# Patient Record
Sex: FEMALE | Race: WHITE | NOT HISPANIC OR LATINO | URBAN - METROPOLITAN AREA
[De-identification: names, ages, dates, MRNs, and addresses within clinical notes are randomized per-mention and may not be internally consistent; named-entity substitution may affect disease eponyms.]

---

## 2024-08-08 RX ORDER — LISINOPRIL 10 MG/1
10 TABLET ORAL
COMMUNITY
Start: 2023-08-08

## 2024-08-08 RX ORDER — ATORVASTATIN CALCIUM 10 MG/1
10 TABLET, FILM COATED ORAL
COMMUNITY
Start: 2023-12-06

## 2024-08-08 RX ORDER — ACETAMINOPHEN 500 MG
2000 TABLET ORAL DAILY
COMMUNITY

## 2024-08-08 RX ORDER — GLIMEPIRIDE 1 MG/1
1 TABLET ORAL
COMMUNITY
Start: 2024-05-21

## 2024-08-08 ASSESSMENT — DUKE ACTIVITY SCORE INDEX (DASI)
CAN YOU RUN A SHORT DISTANCE: YES
TOTAL_SCORE: 44.7
CAN YOU WALK INDOORS, SUCH AS AROUND YOUR HOUSE: YES
CAN YOU DO YARD WORK LIKE RAKING LEAVES, WEEDING OR PUSHING A MOWER: YES
CAN YOU DO MODERATE WORK AROUND THE HOUSE LIKE VACUUMING, SWEEPING FLOORS OR CARRYING GROCERIES: YES
CAN YOU WALK A BLOCK OR TWO ON LEVEL GROUND: YES
CAN YOU DO LIGHT WORK AROUND THE HOUSE LIKE DUSTING OR WASHING DISHES: YES
CAN YOU HAVE SEXUAL RELATIONS: YES
CAN YOU PARTICIPATE IN STRENOUS SPORTS LIKE SWIMMING, SINGLES TENNIS, FOOTBALL, BASKETBALL, OR SKIING: NO
CAN YOU DO HEAVY WORK AROUND THE HOUSE LIKE SCRUBBING FLOORS OR LIFTING AND MOVING HEAVY FURNITURE: YES
CAN YOU PARTICIPATE IN MODERATE RECREATIONAL ACTIVITIES LIKE GOLF, BOWLING, DANCING, DOUBLES TENNIS OR THROWING A BASEBALL OR FOOTBALL: NO
DASI METS SCORE: 8.2
CAN YOU CLIMB A FLIGHT OF STAIRS OR WALK UP A HILL: YES
CAN YOU TAKE CARE OF YOURSELF (EAT, DRESS, BATHE, OR USE TOILET): YES

## 2024-08-08 ASSESSMENT — ACTIVITIES OF DAILY LIVING (ADL): ADL_SCORE: 0

## 2024-08-08 ASSESSMENT — LIFESTYLE VARIABLES: SMOKING_STATUS: NONSMOKER

## 2024-08-09 ENCOUNTER — PRE-ADMISSION TESTING (OUTPATIENT)
Dept: PREADMISSION TESTING | Facility: HOSPITAL | Age: 69
End: 2024-08-09
Payer: COMMERCIAL

## 2024-08-09 ENCOUNTER — LAB (OUTPATIENT)
Dept: LAB | Facility: LAB | Age: 69
End: 2024-08-09
Payer: COMMERCIAL

## 2024-08-09 VITALS
OXYGEN SATURATION: 96 % | TEMPERATURE: 97 F | HEIGHT: 67 IN | RESPIRATION RATE: 16 BRPM | DIASTOLIC BLOOD PRESSURE: 76 MMHG | WEIGHT: 243.83 LBS | HEART RATE: 82 BPM | SYSTOLIC BLOOD PRESSURE: 132 MMHG | BODY MASS INDEX: 38.27 KG/M2

## 2024-08-09 DIAGNOSIS — M65.342 TRIGGER RING FINGER OF LEFT HAND: ICD-10-CM

## 2024-08-09 DIAGNOSIS — Z01.818 PREOP EXAMINATION: Primary | ICD-10-CM

## 2024-08-09 DIAGNOSIS — Z01.818 PREOP EXAMINATION: ICD-10-CM

## 2024-08-09 LAB
ANION GAP SERPL CALC-SCNC: 13 MMOL/L (ref 10–20)
BUN SERPL-MCNC: 22 MG/DL (ref 6–23)
CALCIUM SERPL-MCNC: 9.4 MG/DL (ref 8.6–10.3)
CHLORIDE SERPL-SCNC: 103 MMOL/L (ref 98–107)
CO2 SERPL-SCNC: 27 MMOL/L (ref 21–32)
CREAT SERPL-MCNC: 0.78 MG/DL (ref 0.5–1.05)
EGFRCR SERPLBLD CKD-EPI 2021: 83 ML/MIN/1.73M*2
EST. AVERAGE GLUCOSE BLD GHB EST-MCNC: 134 MG/DL
GLUCOSE SERPL-MCNC: 142 MG/DL (ref 74–99)
HBA1C MFR BLD: 6.3 %
POTASSIUM SERPL-SCNC: 4.5 MMOL/L (ref 3.5–5.3)
SODIUM SERPL-SCNC: 138 MMOL/L (ref 136–145)

## 2024-08-09 PROCEDURE — 99202 OFFICE O/P NEW SF 15 MIN: CPT

## 2024-08-09 PROCEDURE — 93005 ELECTROCARDIOGRAM TRACING: CPT

## 2024-08-09 PROCEDURE — 83036 HEMOGLOBIN GLYCOSYLATED A1C: CPT

## 2024-08-09 PROCEDURE — 93010 ELECTROCARDIOGRAM REPORT: CPT | Performed by: INTERNAL MEDICINE

## 2024-08-09 PROCEDURE — 80048 BASIC METABOLIC PNL TOTAL CA: CPT

## 2024-08-09 PROCEDURE — 36415 COLL VENOUS BLD VENIPUNCTURE: CPT

## 2024-08-09 ASSESSMENT — PAIN - FUNCTIONAL ASSESSMENT: PAIN_FUNCTIONAL_ASSESSMENT: 0-10

## 2024-08-09 NOTE — PREPROCEDURE INSTRUCTIONS
Medication List            Accurate as of August 9, 2024  8:40 AM. Always use your most recent med list.                atorvastatin 10 mg tablet  Commonly known as: Lipitor  Medication Adjustments for Surgery: Continue until night before surgery     cholecalciferol 50 mcg (2,000 unit) capsule  Commonly known as: Vitamin D-3  Medication Adjustments for Surgery: Stop 7 days before surgery     empagliflozin 25 mg  Commonly known as: Jardiance  Medication Adjustments for Surgery: Stop 3 days before surgery     glimepiride 1 mg tablet  Commonly known as: Amaryl  Medication Adjustments for Surgery: Stop 1 day before surgery     lisinopril 10 mg tablet  Medication Adjustments for Surgery: Other (Comment)  Notes to patient: HOLD any evening dose the night before the day of surgery  HOLD the day of surgery                                  PRE-OPERATIVE INSTRUCTIONS    You will receive notification one business day prior to your procedure to confirm your arrival time. It is important that you answer your phone and/or check your messages during this time. If you do not hear from the surgery center by 5 pm. the day before your procedure, please call [phone].     Please enter the building through the Outpatient entrance and take the elevator off the lobby to the 2nd floor then check in at the Outpatient Surgery desk on the 2nd floor.    INSTRUCTIONS:  Talk to your surgeon for instructions if you should stop your aspirin, blood thinner, or diabetes medicines.  DO NOT take any multivitamins or over the counter supplements for 7-10 days before surgery.  If not being admitted, you must have an adult immediately available to drive you home after surgery. We also highly recommend you have someone stay with you for the entire day and night of your surgery.  For children having surgery, a parent or legal guardian must accompany them to the surgery center. If this is not possible, please call [phone] to make additional  arrangements.  For adults who are unable to consent or make medical decisions for themselves, a legal guardian or Power of  must accompany them to the surgery center. If this is not possible, please call [phone] to make additional arrangements.  Wear comfortable, loose fitting clothing.  All jewelry and piercings must be removed. If you are unable to remove an item or have a dermal piercing, please be sure to tell the nurse when you arrive for surgery.  Nail polish and make-up must be removed.  Avoid smoking or consuming alcohol for 24 hours before surgery.  To help prevent infection, please take a shower/bath and wash your hair the night before and/or morning of surgery (or follow other specific bathing instructions provided).    Preoperative Fasting Guidelines    Why must I stop eating and drinking near surgery time?  With sedation, food or liquid in your stomach can enter your lungs causing serious complications  Increases nausea and vomiting    When do I need to stop eating and drinking before my surgery?  Do not eat any solid food after midnight the night before your surgery/procedure unless otherwise instructed by your surgeon.   You may have up to 13.5 ounces of clear liquid until TWO hours before your instructed arrival time to the hospital.  This includes water, black tea/coffee, (no milk or cream) apple juice, and electrolyte drinks (Gatorade).   You may chew gum until TWO hours before your surgery/procedure      If applicable, notify your surgeons office immediately of any new skin changes that occur to the surgical limb.      If you have any questions or concerns, please call Pre-Admission Testing at [phone].       Home Preoperative Antibacterial Shower with Chlorhexidine gluconate (CHG)     What is a home preoperative antibacterial shower?  This shower is a way of cleaning the skin with a germ killing solution before surgery. The solution contains chlorhexidine gluconate, commonly  known as CHG. CHG is a skin cleanser with germ killing ability. Let your doctor know if you are allergic to chlorhexidine.    Why do I need to take a preoperative antibacterial shower?  Skin is not sterile. It is best to try to make your skin as free of germs as possible before surgery. Proper cleansing with a germ killing soap before surgery can lower the number of germs on your skin. This helps to reduce the risk of infection at the surgical site. Following the instructions listed below will help you prepare your skin for surgery.    How do I use the solution?  Begin using your CHG soap the night before and again the morning of your procedure.   Do not shave the day before or day of surgery.  Remove all jewelry until after surgery. Take off rings and take out all body-piercing jewelry.  Wash your face and hair with normal soap and shampoo before you use the CHG soap.  Apply the CHG solution to a clean wet washcloth. Move away from the water to avoid premature rinsing of the CHG soap as you are applying. Firmly lather your entire body from the neck down. Do not use CHG on your face, eyes, ears, or genitals.   Pay special attention to the area where your incisions will be located.  Do not scrub your skin too hard.  It is important to allow the CHG soap to sit on your skin for 3-5 minutes.  Rinse the solution off your body completely. Do not wash with your normal soap after the CHG soap solution.  Pat yourself dry with a clean, soft towel.  Do not apply powders, lotions or deodorants as these might block how the CHG soap works.   Dress in clean clothing.  Be sure to sleep with clean, freshly laundered sheets.  Be aware that CHG can cause stains on fabric. Rinse your washcloth and other linens that have contact with CHG completely. Use only non-chlorine detergents to launder the items used.

## 2024-08-09 NOTE — H&P (VIEW-ONLY)
"CPM/PAT Evaluation       Name: Yudi Salas (Yudi Salas)  /Age: 1955/68 y.o.     In-Person       Chief Complaint: Left ring trigger finger     HPI  Pleasant 67 y/o female presents with left ring trigger finger. She has had roughly 3 months of pain and \"locking\" of her finger. Denies other associated symptoms. Denies recent fever/illness/chills.     Past Medical History:   Diagnosis Date    Acquired lymphedema     Arthritis     DVT, lower extremity, distal, acute, right (Multi)     Hyperlipidemia     Hypertension     Liver fibrosis     Macular degeneration, age related, exudative (Multi)     Osteoarthritis     Osteopenia     Type 2 diabetes mellitus (Multi)        Past Surgical History:   Procedure Laterality Date    APPENDECTOMY       SECTION, LOW TRANSVERSE      CHOLECYSTECTOMY      COLONOSCOPY      DILATION AND CURETTAGE OF UTERUS      FOOT SURGERY      KNEE ARTHROPLASTY      KNEE SURGERY      LIVER BIOPSY N/A     OTHER SURGICAL HISTORY      Low back surgery       Patient  has no history on file for sexual activity.    Family History   Problem Relation Name Age of Onset    Colon cancer Mother      Cervical cancer Mother      Colon cancer Father      Heart attack Father      Hypertension Brother         No Known Allergies    Prior to Admission medications    Medication Sig Start Date End Date Taking? Authorizing Provider   atorvastatin (Lipitor) 10 mg tablet Take 1 tablet (10 mg) by mouth once daily. 23  Yes Historical Provider, MD   empagliflozin (Jardiance) 25 mg Take 1 tablet (25 mg) by mouth once daily. 24  Yes Historical Provider, MD   glimepiride (Amaryl) 1 mg tablet Take 1 tablet (1 mg) by mouth 2 times daily (morning and late afternoon). 24  Yes Historical Provider, MD   lisinopril 10 mg tablet Take 1 tablet (10 mg) by mouth once daily. 23  Yes Historical Provider, MD   cholecalciferol (Vitamin D-3) 50 mcg (2,000 unit) capsule Take 1 capsule (50 mcg) by mouth early in " the morning..    Historical Provider, MD        Constitutional: Negative for fever, chills, or sweats   ENMT: Negative for nasal discharge, congestion, ear pain, mouth pain, throat pain. Positive for glasses.   Respiratory: Negative for cough, wheezing, shortness of breath   Cardiac: Negative for chest pain, dyspnea on exertion, palpitations   Gastrointestinal: Negative for nausea, vomiting, diarrhea, constipation, abdominal pain  Genitourinary: Negative for dysuria, flank pain, frequency, hematuria   Musculoskeletal: Negative for decreased ROM, pain, swelling, weakness. See HPI.  Neurological: Negative for dizziness, confusion, headache  Psychiatric: Negative for mood changes   Skin: Negative for itching, rash, ulcer    Hematologic/Lymph: Negative for bruising, easy bleeding  Allergic/Immunologic: Negative itching, sneezing, swelling      Physical Exam  Vitals reviewed.   Constitutional:       Appearance: Normal appearance. She is obese.   HENT:      Head: Normocephalic.      Mouth/Throat:      Mouth: Mucous membranes are moist.      Pharynx: Oropharynx is clear.   Eyes:      Pupils: Pupils are equal, round, and reactive to light.   Cardiovascular:      Rate and Rhythm: Normal rate and regular rhythm.      Heart sounds: Normal heart sounds.   Pulmonary:      Effort: Pulmonary effort is normal.      Breath sounds: Normal breath sounds.   Abdominal:      General: Bowel sounds are normal.      Palpations: Abdomen is soft.   Musculoskeletal:         General: Normal range of motion.      Cervical back: Normal range of motion.   Skin:     General: Skin is warm and dry.   Neurological:      General: No focal deficit present.      Mental Status: She is alert and oriented to person, place, and time.   Psychiatric:         Mood and Affect: Mood normal.         Behavior: Behavior normal.          PAT AIRWAY:   Airway:     Mallampati::  II    Neck ROM::  Full  normal        Visit Vitals  /76   Pulse 82   Temp 36.1 °C (97  °F) (Temporal)   Resp 16       DASI Risk Score      Flowsheet Row Most Recent Value   DASI SCORE 44.7   METS Score (Will be calculated only when all the questions are answered) 8.2          Caprini DVT Assessment      Flowsheet Row Most Recent Value   DVT Score 10   Current Status Minor surgery planned   History SVT, DVT/PE, Prior major surgery  [Small DVT Right lower leg while flying]   Age 60-75 years   BMI 31-40 (Obesity)          Modified Frailty Index      Flowsheet Row Most Recent Value   Modified Frailty Index Calculator .1818          CHADS2 Stroke Risk  Current as of 9 minutes ago        N/A 3 to 100%: High Risk   2 to < 3%: Medium Risk   0 to < 2%: Low Risk     Last Change: N/A          This score determines the patient's risk of having a stroke if the patient has atrial fibrillation.        This score is not applicable to this patient. Components are not calculated.          Revised Cardiac Risk Index    No data to display       Apfel Simplified Score    No data to display       Risk Analysis Index Results This Encounter         8/8/2024  1005             TRUJILLO Cancer History: Patient does not indicate history of cancer    Total Risk Analysis Index Score Without Cancer: 20    Total Risk Analysis Index Score: 20          Stop Bang Score      Flowsheet Row Most Recent Value   Do you snore loudly? 0   Do you often feel tired or fatigued after your sleep? 0   Has anyone ever observed you stop breathing in your sleep? 0   Do you have or are you being treated for high blood pressure? 1   Recent BMI (Calculated) 40.2   Is BMI greater than 35 kg/m2? 1=Yes   Age older than 50 years old? 1=Yes   Is your neck circumference greater than 17 inches (Male) or 16 inches (Female)? 0   Gender - Male 0=No   STOP-BANG Total Score 3            Assessment and Plan:     Assessment and Plan:     Preop:   OR with Dr. Bridges on 8/14  Labs ordered per anesthesia guidelines   EKG obtained and enclosed. Abnormal EKG. Comparable EKG on  "file under media.     Cardiac:  Hypertension: Controlled with medical management   Hyperlipidemia: On statin   Lymphedema: Stable per patient   H/O DVT: Roughly 20 years ago   RCI 0, 3.9% risk for postoperative MACE  Cavazos Activity Status Index (DASI)  DASI Score: 44.7   MET Score: 8.2    GI:  GERD: Stable     Endocrine:  DM II: Oral medications. Ha1c ordered today   Hemoglobin A1C   Date Value Ref Range Status   02/03/2024 6.7 (H) 4.3 - 5.6 % Final     Comment:     American Diabetes Association guidelines indicate that patients with HgbA1c in the range 5.7-6.4% are at increased risk for development of diabetes, and intervention by lifestyle modification may be beneficial. HgbA1c greater or equal to 6.5% is considered diagnostic of diabetes.      Neuro-muscular:   Osteopenia: On vitamin supplements   Osteoarthritis: Mainly of the left knee     General:   Liver fibrosis: Monitored by PCP     Anesthesia: Patient has no history of anesthesia complications. She does feel she \"needs more anesthesia than normal people\".     *See risk scores as previously documented   "

## 2024-08-09 NOTE — PREPROCEDURE INSTRUCTIONS
Patient and Family Education             Ways You Can Help Prevent Blood Clots             This handout explains some simple things you can do to help prevent blood clots.      Blood clots are blockages that can form in the body's veins. When a blood clot forms in your deep veins, it may be called a deep vein thrombosis, or DVT for short. Blood clots can happen in any part of the body where blood flows, but they are most common in the arms and legs. If a piece of a blood clot breaks free and travels to the lungs, it is called a pulmonary embolus (PE). A PE can be a very serious problem.         Being in the hospital or having surgery can raise your chances of getting a blood clot because you may not be well enough to move around as much as you normally do.         Ways you can help prevent blood clots in the hospital         Wearing SCDs. SCDs stands for Sequential Compression Devices.   SCDs are special sleeves that wrap around your legs  They attach to a pump that fills them with air to gently squeeze your legs every few minutes.   This helps return the blood in your legs to your heart.   SCDs should only be taken off when walking or bathing.   SCDs may not be comfortable, but they can help save your life.               Wearing compression stockings - if your doctor orders them. These special snug fitting stockings gently squeeze your legs to help blood flow.       Walking. Walking helps move the blood in your legs.   If your doctor says it is ok, try walking the halls at least   5 times a day. Ask us to help you get up, so you don't fall.      Taking any blood thinning medicines your doctor orders.        Page 1 of 2     [facility]; 3/23   Ways you can help prevent blood clots at home       Wearing compression stockings - if your doctor orders them. ? Walking - to help move the blood in your legs.       Taking any blood thinning medicines your doctor orders.      Signs of a blood clot or PE       Tell your doctor or nurse know right away if you have of the problems listed below.    If you are at home, seek medical care right away. Call 911 for chest pain or problems breathing.               Signs of a blood clot (DVT) - such as pain,  swelling, redness or warmth in your arm or leg      Signs of a pulmonary embolism (PE) - such as chest     pain or feeling short of breath

## 2024-08-10 LAB
ATRIAL RATE: 79 BPM
P AXIS: 50 DEGREES
P OFFSET: 183 MS
P ONSET: 128 MS
PR INTERVAL: 182 MS
Q ONSET: 219 MS
QRS COUNT: 13 BEATS
QRS DURATION: 84 MS
QT INTERVAL: 390 MS
QTC CALCULATION(BAZETT): 447 MS
QTC FREDERICIA: 427 MS
R AXIS: -12 DEGREES
T AXIS: 23 DEGREES
T OFFSET: 414 MS
VENTRICULAR RATE: 79 BPM

## 2024-08-14 ENCOUNTER — ANESTHESIA EVENT (OUTPATIENT)
Dept: OPERATING ROOM | Facility: HOSPITAL | Age: 69
End: 2024-08-14
Payer: COMMERCIAL

## 2024-08-14 ENCOUNTER — ANESTHESIA (OUTPATIENT)
Dept: OPERATING ROOM | Facility: HOSPITAL | Age: 69
End: 2024-08-14
Payer: COMMERCIAL

## 2024-08-14 ENCOUNTER — HOSPITAL ENCOUNTER (OUTPATIENT)
Facility: HOSPITAL | Age: 69
Setting detail: OUTPATIENT SURGERY
Discharge: HOME | End: 2024-08-14
Attending: ORTHOPAEDIC SURGERY | Admitting: ORTHOPAEDIC SURGERY
Payer: COMMERCIAL

## 2024-08-14 VITALS
BODY MASS INDEX: 37.67 KG/M2 | HEIGHT: 67 IN | WEIGHT: 240 LBS | SYSTOLIC BLOOD PRESSURE: 132 MMHG | HEART RATE: 82 BPM | RESPIRATION RATE: 17 BRPM | DIASTOLIC BLOOD PRESSURE: 74 MMHG | OXYGEN SATURATION: 97 % | TEMPERATURE: 97.2 F

## 2024-08-14 DIAGNOSIS — M65.342 TRIGGER FINGER, LEFT RING FINGER: Primary | ICD-10-CM

## 2024-08-14 LAB — GLUCOSE BLD MANUAL STRIP-MCNC: 153 MG/DL (ref 74–99)

## 2024-08-14 PROCEDURE — 7100000010 HC PHASE TWO TIME - EACH INCREMENTAL 1 MINUTE: Performed by: ORTHOPAEDIC SURGERY

## 2024-08-14 PROCEDURE — 3600000003 HC OR TIME - INITIAL BASE CHARGE - PROCEDURE LEVEL THREE: Performed by: ORTHOPAEDIC SURGERY

## 2024-08-14 PROCEDURE — A26055 PR INCISE FINGER TENDON SHEATH: Performed by: ANESTHESIOLOGY

## 2024-08-14 PROCEDURE — 2500000004 HC RX 250 GENERAL PHARMACY W/ HCPCS (ALT 636 FOR OP/ED): Mod: JZ | Performed by: ORTHOPAEDIC SURGERY

## 2024-08-14 PROCEDURE — 2720000007 HC OR 272 NO HCPCS: Performed by: ORTHOPAEDIC SURGERY

## 2024-08-14 PROCEDURE — 2500000004 HC RX 250 GENERAL PHARMACY W/ HCPCS (ALT 636 FOR OP/ED): Performed by: ANESTHESIOLOGIST ASSISTANT

## 2024-08-14 PROCEDURE — 3600000008 HC OR TIME - EACH INCREMENTAL 1 MINUTE - PROCEDURE LEVEL THREE: Performed by: ORTHOPAEDIC SURGERY

## 2024-08-14 PROCEDURE — A26055 PR INCISE FINGER TENDON SHEATH: Performed by: ANESTHESIOLOGIST ASSISTANT

## 2024-08-14 PROCEDURE — 3700000002 HC GENERAL ANESTHESIA TIME - EACH INCREMENTAL 1 MINUTE: Performed by: ORTHOPAEDIC SURGERY

## 2024-08-14 PROCEDURE — 2500000005 HC RX 250 GENERAL PHARMACY W/O HCPCS: Performed by: ORTHOPAEDIC SURGERY

## 2024-08-14 PROCEDURE — 3700000001 HC GENERAL ANESTHESIA TIME - INITIAL BASE CHARGE: Performed by: ORTHOPAEDIC SURGERY

## 2024-08-14 PROCEDURE — 82947 ASSAY GLUCOSE BLOOD QUANT: CPT

## 2024-08-14 PROCEDURE — 7100000009 HC PHASE TWO TIME - INITIAL BASE CHARGE: Performed by: ORTHOPAEDIC SURGERY

## 2024-08-14 RX ORDER — FENTANYL CITRATE 50 UG/ML
INJECTION, SOLUTION INTRAMUSCULAR; INTRAVENOUS AS NEEDED
Status: DISCONTINUED | OUTPATIENT
Start: 2024-08-14 | End: 2024-08-14

## 2024-08-14 RX ORDER — MIDAZOLAM HYDROCHLORIDE 1 MG/ML
INJECTION, SOLUTION INTRAMUSCULAR; INTRAVENOUS AS NEEDED
Status: DISCONTINUED | OUTPATIENT
Start: 2024-08-14 | End: 2024-08-14

## 2024-08-14 RX ORDER — HYDROMORPHONE HYDROCHLORIDE 1 MG/ML
1 INJECTION, SOLUTION INTRAMUSCULAR; INTRAVENOUS; SUBCUTANEOUS EVERY 5 MIN PRN
Status: DISCONTINUED | OUTPATIENT
Start: 2024-08-14 | End: 2024-08-14 | Stop reason: HOSPADM

## 2024-08-14 RX ORDER — MIDAZOLAM HYDROCHLORIDE 1 MG/ML
1 INJECTION, SOLUTION INTRAMUSCULAR; INTRAVENOUS ONCE AS NEEDED
Status: CANCELLED | OUTPATIENT
Start: 2024-08-14

## 2024-08-14 RX ORDER — HYDRALAZINE HYDROCHLORIDE 20 MG/ML
5 INJECTION INTRAMUSCULAR; INTRAVENOUS EVERY 30 MIN PRN
Status: DISCONTINUED | OUTPATIENT
Start: 2024-08-14 | End: 2024-08-14 | Stop reason: HOSPADM

## 2024-08-14 RX ORDER — METOCLOPRAMIDE HYDROCHLORIDE 5 MG/ML
10 INJECTION INTRAMUSCULAR; INTRAVENOUS ONCE AS NEEDED
Status: DISCONTINUED | OUTPATIENT
Start: 2024-08-14 | End: 2024-08-14 | Stop reason: HOSPADM

## 2024-08-14 RX ORDER — SODIUM CHLORIDE, SODIUM LACTATE, POTASSIUM CHLORIDE, CALCIUM CHLORIDE 600; 310; 30; 20 MG/100ML; MG/100ML; MG/100ML; MG/100ML
INJECTION, SOLUTION INTRAVENOUS CONTINUOUS PRN
Status: DISCONTINUED | OUTPATIENT
Start: 2024-08-14 | End: 2024-08-14

## 2024-08-14 RX ORDER — DIPHENHYDRAMINE HYDROCHLORIDE 50 MG/ML
12.5 INJECTION INTRAMUSCULAR; INTRAVENOUS ONCE AS NEEDED
Status: DISCONTINUED | OUTPATIENT
Start: 2024-08-14 | End: 2024-08-14 | Stop reason: HOSPADM

## 2024-08-14 RX ORDER — HYDROCODONE BITARTRATE AND ACETAMINOPHEN 5; 325 MG/1; MG/1
1 TABLET ORAL EVERY 4 HOURS PRN
Status: DISCONTINUED | OUTPATIENT
Start: 2024-08-14 | End: 2024-08-14 | Stop reason: HOSPADM

## 2024-08-14 RX ORDER — CEFAZOLIN SODIUM 2 G/100ML
2 INJECTION, SOLUTION INTRAVENOUS ONCE
Status: COMPLETED | OUTPATIENT
Start: 2024-08-14 | End: 2024-08-14

## 2024-08-14 RX ORDER — ALBUTEROL SULFATE 0.83 MG/ML
2.5 SOLUTION RESPIRATORY (INHALATION)
Status: DISCONTINUED | OUTPATIENT
Start: 2024-08-14 | End: 2024-08-14 | Stop reason: HOSPADM

## 2024-08-14 RX ORDER — LABETALOL HYDROCHLORIDE 5 MG/ML
5 INJECTION, SOLUTION INTRAVENOUS
Status: DISCONTINUED | OUTPATIENT
Start: 2024-08-14 | End: 2024-08-14 | Stop reason: HOSPADM

## 2024-08-14 RX ORDER — LIDOCAINE HYDROCHLORIDE AND EPINEPHRINE 10; 10 MG/ML; UG/ML
INJECTION, SOLUTION INFILTRATION; PERINEURAL AS NEEDED
Status: DISCONTINUED | OUTPATIENT
Start: 2024-08-14 | End: 2024-08-14 | Stop reason: HOSPADM

## 2024-08-14 RX ORDER — HYDROCODONE BITARTRATE AND ACETAMINOPHEN 5; 325 MG/1; MG/1
1 TABLET ORAL EVERY 4 HOURS PRN
Qty: 10 TABLET | Refills: 0 | Status: SHIPPED | OUTPATIENT
Start: 2024-08-14

## 2024-08-14 RX ORDER — SODIUM CHLORIDE, SODIUM LACTATE, POTASSIUM CHLORIDE, CALCIUM CHLORIDE 600; 310; 30; 20 MG/100ML; MG/100ML; MG/100ML; MG/100ML
100 INJECTION, SOLUTION INTRAVENOUS CONTINUOUS
Status: DISCONTINUED | OUTPATIENT
Start: 2024-08-14 | End: 2024-08-14 | Stop reason: HOSPADM

## 2024-08-14 RX ORDER — PROPOFOL 10 MG/ML
INJECTION, EMULSION INTRAVENOUS AS NEEDED
Status: DISCONTINUED | OUTPATIENT
Start: 2024-08-14 | End: 2024-08-14

## 2024-08-14 RX ORDER — ONDANSETRON HYDROCHLORIDE 2 MG/ML
INJECTION, SOLUTION INTRAVENOUS AS NEEDED
Status: DISCONTINUED | OUTPATIENT
Start: 2024-08-14 | End: 2024-08-14

## 2024-08-14 SDOH — HEALTH STABILITY: MENTAL HEALTH: CURRENT SMOKER: 0

## 2024-08-14 ASSESSMENT — PAIN - FUNCTIONAL ASSESSMENT
PAIN_FUNCTIONAL_ASSESSMENT: 0-10

## 2024-08-14 ASSESSMENT — PAIN SCALES - GENERAL
PAINLEVEL_OUTOF10: 0 - NO PAIN
PAIN_LEVEL: 0
PAINLEVEL_OUTOF10: 0 - NO PAIN

## 2024-08-14 ASSESSMENT — COLUMBIA-SUICIDE SEVERITY RATING SCALE - C-SSRS
2. HAVE YOU ACTUALLY HAD ANY THOUGHTS OF KILLING YOURSELF?: NO
1. IN THE PAST MONTH, HAVE YOU WISHED YOU WERE DEAD OR WISHED YOU COULD GO TO SLEEP AND NOT WAKE UP?: NO
6. HAVE YOU EVER DONE ANYTHING, STARTED TO DO ANYTHING, OR PREPARED TO DO ANYTHING TO END YOUR LIFE?: NO

## 2024-08-14 NOTE — ANESTHESIA PREPROCEDURE EVALUATION
Patient: Yudi Salas    Procedure Information       Date/Time: 24 1000    Procedure: Left ring finger trigger finger release (Left: Fingers)    Location: [facility] 06 / [facility]    Surgeons: Daniel Bridges MD            Relevant Problems   No relevant active problems       Clinical information reviewed:   Tobacco  Allergies  Meds   Med Hx  Surg Hx   Fam Hx  Soc Hx        NPO Detail:  NPO/Void Status  Date of Last Liquid: 24  Time of Last Liquid: 06  Date of Last Solid: 24  Time of Last Solid:   Last Intake Type: Clear fluids  Time of Last Void: 08         Physical Exam    Airway  Mallampati: III  TM distance: >3 FB  Neck ROM: full     Cardiovascular - normal exam     Dental - normal exam     Pulmonary - normal exam     Abdominal - normal exam           Vitals:    24 0852   BP: 142/85   Pulse: 81   Resp: 17   Temp: 37 °C (98.6 °F)   SpO2: 96%       Past Surgical History:   Procedure Laterality Date    APPENDECTOMY       SECTION, LOW TRANSVERSE      CHOLECYSTECTOMY      COLONOSCOPY      DILATION AND CURETTAGE OF UTERUS      FOOT SURGERY      KNEE ARTHROPLASTY      KNEE SURGERY      LIVER BIOPSY N/A     OTHER SURGICAL HISTORY      Low back surgery     Past Medical History:   Diagnosis Date    Acquired lymphedema     Arthritis     DVT, lower extremity, distal, acute, right (Multi)     Hyperlipidemia     Hypertension     Liver fibrosis     Macular degeneration, age related, exudative (Multi)     Osteoarthritis     Osteopenia     Type 2 diabetes mellitus (Multi)        Current Facility-Administered Medications:     ceFAZolin (Ancef) 2 g in dextrose (iso)  mL, 2 g, intravenous, Once, Daniel Bridges MD  Prior to Admission medications    Medication Sig Start Date End Date Taking? Authorizing Provider   atorvastatin (Lipitor) 10 mg tablet Take 1 tablet (10 mg) by mouth once daily. 23  Yes Historical Provider, MD   cholecalciferol (Vitamin D-3) 50 mcg (2,000  "unit) capsule Take 1 capsule (50 mcg) by mouth early in the morning..   Yes Historical Provider, MD   empagliflozin (Jardiance) 25 mg Take 1 tablet (25 mg) by mouth once daily. 5/21/24  Yes Historical Provider, MD   glimepiride (Amaryl) 1 mg tablet Take 1 tablet (1 mg) by mouth 2 times daily (morning and late afternoon). 5/21/24  Yes Historical Provider, MD   lisinopril 10 mg tablet Take 1 tablet (10 mg) by mouth once daily. 8/8/23   Historical Provider, MD     No Known Allergies  Social History     Tobacco Use    Smoking status: Never    Smokeless tobacco: Never   Substance Use Topics    Alcohol use: Not on file     Comment: rarely         Chemistry    Lab Results   Component Value Date/Time     08/09/2024 0846    K 4.5 08/09/2024 0846     08/09/2024 0846    CO2 27 08/09/2024 0846    BUN 22 08/09/2024 0846    CREATININE 0.78 08/09/2024 0846    Lab Results   Component Value Date/Time    CALCIUM 9.4 08/09/2024 0846    ALKPHOS 63 07/12/2023 2258    AST 20 07/12/2023 2258    ALT 19 07/12/2023 2258    BILITOT 0.4 07/12/2023 2258          Lab Results   Component Value Date/Time    WBC 13.8 (H) 07/12/2023 2258    HGB 16.8 (H) 07/12/2023 2258    HCT 48.7 (H) 07/12/2023 2258     07/12/2023 2258     No results found for: \"PROTIME\", \"PTT\", \"INR\"  Encounter Date: 08/09/24   ECG 12 lead (Clinic Performed)   Result Value    Ventricular Rate 79    Atrial Rate 79    KY Interval 182    QRS Duration 84    QT Interval 390    QTC Calculation(Bazett) 447    P Axis 50    R Axis -12    T Axis 23    QRS Count 13    Q Onset 219    P Onset 128    P Offset 183    T Offset 414    QTC Fredericia 427    Narrative    Normal sinus rhythm  Possible Left atrial enlargement  Cannot rule out Inferior infarct , age undetermined  Poor R-wave progression  Abnormal ECG  When compared with ECG of 02-DEC-2020 09:06,  Possible Inferior infarct is now Present  Confirmed by Osbaldo Dotson ([id number]) on 8/10/2024 2:49:54 PM        Anesthesia " Plan    History of general anesthesia?: yes  History of complications of general anesthesia?: no    ASA 2     MAC     The patient is not a current smoker.    intravenous induction   Anesthetic plan and risks discussed with patient.    Plan discussed with [facility].

## 2024-08-14 NOTE — DISCHARGE INSTRUCTIONS
Patient underwent a left ring finger trigger finger release.    Dressing to remain on until 8/16/2024.    Follow-up after dressing removal May cover with Band-Aid.    Should keep covered and from getting wet until seen in office.  You may shower however keep surgical site clean and dry and covered with either plastic bag or cast protector.    If you notice any, redness, drainage or your incision comes apart please call the office [phone].    No lifting, pulling, or pushing with the left upper extremity greater than 5 pounds.    Patient may take ibuprofen 400 mg every 6 hours to help with pain relief.    You may resume your prehospital diet.    You are also given a prescription for narcotic medication.  This should be used intermittently and only on an as-needed basis.    Follow-up in office at previously scheduled appointment.    Daniel Bridges MD  [facility]  [phone]

## 2024-08-14 NOTE — OP NOTE
Left ring finger trigger finger release (L) Operative Note     Date: 2024  OR Location: [facility]    Name: Yudi Salas, : 1955, Age: 68 y.o., MRN: [id number], Sex: female    Diagnosis  Pre-op Diagnosis      * Trigger finger, left ring finger [M65.342] Post-op Diagnosis     * Trigger finger, left ring finger [M65.342]     Procedures  Left ring finger trigger finger release  90681 - IL TENDON SHEATH INCISION      Surgeons      * Daniel Bridges - Primary    Resident/Fellow/Other Assistant:  Surgeons and Role:     * Carlos Nix PA-C - Assisting    Procedure Summary  Anesthesia: Monitor Anesthesia Care  ASA: II  Anesthesia Staff: Anesthesiologist: Anne Raymond MD  C-AA: YADIRA Oro  Estimated Blood Loss: 0 mL  Intra-op Medications:   Administrations occurring from 1000 to 1055 on 24:   Medication Name Total Dose   ceFAZolin (Ancef) 2 g in dextrose (iso)  mL 2 g              Anesthesia Record               Intraprocedure I/O Totals       None           Specimen: No specimens collected     Staff:   Circulator: Vincent Pemberton Person: Ellie         Drains and/or Catheters: * None in log *    Tourniquet Times:         Implants:     Findings: Trigger finger left ring finger    Indications: Yudi Salas is an 68 y.o. female who is having surgery for left ring trigger finger.  Patient has had ongoing catching and locking appreciated a left ring finger which is caused pain and difficulty using it.  We discussed multiple measures to address this and ultimately recommendation was made to surgically proceed with A1 pulley release of the left ring finger.  Wrist benefits and alternatives were discussed extents with the patient she was in agreement to proceed.    The patient was seen in the preoperative area. The risks, benefits, complications, treatment options, non-operative alternatives, expected recovery and outcomes were discussed with the patient. The possibilities of reaction to  medication, pulmonary aspiration, injury to surrounding structures, bleeding, recurrent infection, the need for additional procedures, failure to diagnose a condition, and creating a complication requiring transfusion or operation were discussed with the patient. The patient concurred with the proposed plan, giving informed consent.  The site of surgery was properly noted/marked if necessary per policy. The patient has been actively warmed in preoperative area. Preoperative antibiotics have been ordered and given within 1 hours of incision. Venous thrombosis prophylaxis are not indicated.    Procedure Details: Patient was seen in the preoperative holding area and confirmed appropriate location of the triggering digit which was the left ring finger.  Patient was taken the op room and placed supine the operative table.  The left upper extremity was prepped and draped as is typical for extremity procedure with an arm tourniquet applied.  A timeout was performed, all parties identified, and the correct surgical site was noted.  Volar aspect about the proximal flexion crease along the left ring finger.  Injection of 7 cc of 1% lidocaine with epinephrine was utilized to provide regional analgesia.  The hand and forearm were exsanguinated and tourniquet was inflated to 250 mmHg.  Proximal flexion crease which was centered over the A1 pulley of the left ring finger was incised in a mildly oblique manner in line with the flexion crease and after exposure of subcutaneous tissue blunt dissection was carried down to the level of the flexor tendon sheath.  Neurovascular bundles were visualized and protected both radially and ulnarly.  A1 pulley sheath demonstrated extensive scarring and fibrosis.  And obvious locking and catching appreciated on gentle flexion and extension of the digit.  The A1 pulley was released longitudinally along the flexor tendon sheath.  This was carried partially into the A2 portion distally to allow for  appropriate gliding and the A0 pulley was released proximally.  Both tendons were elevated out of the surgical wound with no catching or locking appreciated.  Patient has extensive arthritic change to the DIP joint and as such this did not demonstrate terminal extent of flexion however tendons overall appeared healthy.  Wound was irrigated with normal saline.  Skin was closed with Prolene suture.  A soft compressive dressing was applied and tourniquet was subsequently deflated with excellent reperfusion of the hand.    An assistant was used during this case.  Assistant Carlos Nix PA-C helped in the positioning, prepping, draping, retracting and aiding in my ability to perform critical portions of this case and performed the majority of the closure and dressing application.  Help from the assistant was critical in performing these functions.    Complications:  None; patient tolerated the procedure well.    Disposition: PACU - hemodynamically stable.  Condition: stable       Daniel J Yuliana  Phone Number: [phone]

## 2025-07-03 ENCOUNTER — APPOINTMENT (OUTPATIENT)
Dept: RADIOLOGY | Facility: HOSPITAL | Age: 70
End: 2025-07-03
Payer: MEDICARE

## 2025-07-03 ENCOUNTER — HOSPITAL ENCOUNTER (EMERGENCY)
Facility: HOSPITAL | Age: 70
Discharge: HOME | End: 2025-07-03
Payer: MEDICARE

## 2025-07-03 VITALS
BODY MASS INDEX: 37.67 KG/M2 | RESPIRATION RATE: 16 BRPM | HEART RATE: 100 BPM | OXYGEN SATURATION: 96 % | HEIGHT: 67 IN | WEIGHT: 240 LBS | TEMPERATURE: 98 F | SYSTOLIC BLOOD PRESSURE: 148 MMHG | DIASTOLIC BLOOD PRESSURE: 87 MMHG
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
## 2025-07-03 DIAGNOSIS — S90.01XA CONTUSION OF RIGHT ANKLE, INITIAL ENCOUNTER: ICD-10-CM

## 2025-07-03 PROCEDURE — 2500000001 HC RX 250 WO HCPCS SELF ADMINISTERED DRUGS (ALT 637 FOR MEDICARE OP)

## 2025-07-03 PROCEDURE — 73630 X-RAY EXAM OF FOOT: CPT | Mod: RT

## 2025-07-03 PROCEDURE — 73630 X-RAY EXAM OF FOOT: CPT | Mod: RIGHT SIDE | Performed by: RADIOLOGY

## 2025-07-03 PROCEDURE — 99284 EMERGENCY DEPT VISIT MOD MDM: CPT

## 2025-07-03 PROCEDURE — 73610 X-RAY EXAM OF ANKLE: CPT | Mod: RT

## 2025-07-03 PROCEDURE — 73610 X-RAY EXAM OF ANKLE: CPT | Mod: RIGHT SIDE | Performed by: RADIOLOGY

## 2025-07-03 PROCEDURE — 2500000004 HC RX 250 GENERAL PHARMACY W/ HCPCS (ALT 636 FOR OP/ED)

## 2025-07-03 RX ORDER — MELOXICAM 15 MG/1
15 TABLET ORAL AS NEEDED
COMMUNITY

## 2025-07-03 RX ORDER — OXYCODONE AND ACETAMINOPHEN 5; 325 MG/1; MG/1
1 TABLET ORAL ONCE
Refills: 0 | Status: COMPLETED | OUTPATIENT
Start: 2025-07-03 | End: 2025-07-03

## 2025-07-03 RX ORDER — AMOXICILLIN AND CLAVULANATE POTASSIUM 875; 125 MG/1; MG/1
1 TABLET, FILM COATED ORAL ONCE
Status: COMPLETED | OUTPATIENT
Start: 2025-07-03 | End: 2025-07-03

## 2025-07-03 RX ORDER — LIDOCAINE HYDROCHLORIDE 10 MG/ML
5 INJECTION, SOLUTION INFILTRATION; PERINEURAL ONCE
Status: COMPLETED | OUTPATIENT
Start: 2025-07-03 | End: 2025-07-03

## 2025-07-03 RX ORDER — AMOXICILLIN AND CLAVULANATE POTASSIUM 875; 125 MG/1; MG/1
1 TABLET, FILM COATED ORAL EVERY 12 HOURS
Qty: 14 TABLET | Refills: 0 | Status: SHIPPED | OUTPATIENT
Start: 2025-07-03 | End: 2025-07-10

## 2025-07-03 RX ADMIN — OXYCODONE HYDROCHLORIDE AND ACETAMINOPHEN 1 TABLET: 5; 325 TABLET ORAL at 16:09

## 2025-07-03 RX ADMIN — AMOXICILLIN AND CLAVULANATE POTASSIUM 1 TABLET: 875; 125 TABLET, COATED ORAL at 16:36

## 2025-07-03 RX ADMIN — LIDOCAINE HYDROCHLORIDE 5 ML: 10 INJECTION, SOLUTION INFILTRATION; PERINEURAL at 17:00

## 2025-07-03 ASSESSMENT — PAIN SCALES - GENERAL
PAINLEVEL_OUTOF10: 5 - MODERATE PAIN
PAINLEVEL_OUTOF10: 5 - MODERATE PAIN

## 2025-07-03 ASSESSMENT — LIFESTYLE VARIABLES
TOTAL SCORE: 0
HAVE PEOPLE ANNOYED YOU BY CRITICIZING YOUR DRINKING: NO
EVER HAD A DRINK FIRST THING IN THE MORNING TO STEADY YOUR NERVES TO GET RID OF A HANGOVER: NO
HAVE YOU EVER FELT YOU SHOULD CUT DOWN ON YOUR DRINKING: NO
EVER FELT BAD OR GUILTY ABOUT YOUR DRINKING: NO

## 2025-07-03 ASSESSMENT — PAIN - FUNCTIONAL ASSESSMENT: PAIN_FUNCTIONAL_ASSESSMENT: 0-10

## 2025-07-03 ASSESSMENT — PAIN DESCRIPTION - PROGRESSION: CLINICAL_PROGRESSION: NOT CHANGED

## 2025-07-03 NOTE — PROGRESS NOTES
Yudi Salas is a 69 y.o. female admitted for No Principal Problem currently listed. Pharmacy reviewed the patient's dihsk-eq-prjbreque medications and allergies for accuracy.    Medications ADDED:  meloxicam (Mobic) 15 mg tablet  OMEPRAZOLE ORAL  cyanocobalamin, vitamin B-12, (VITAMIN B-12 SL)  Medications CHANGED:  None  Medications REMOVED:   HYDROcodone-acetaminophen (Norco) 5-325 mg tablet     The list below reflects the updated PTA list. Comments regarding how patient may be taking medications differently can be found in the Admit Orders Activity  Prior to Admission Medications   Prescriptions Last Dose Informant   OMEPRAZOLE ORAL PRN Self   Sig: Take 1 capsule by mouth if needed.   Take with Meloxicam.   atorvastatin (Lipitor) 10 mg tablet 7/3/2025 Morning Self   Sig: Take 1 tablet (10 mg) by mouth once daily.   cholecalciferol (Vitamin D-3) 50 mcg (2,000 unit) capsule 7/3/2025 Morning Self   Sig: Take 1 capsule (2,000 Units) by mouth early in the morning..   cyanocobalamin, vitamin B-12, (VITAMIN B-12 SL) Past Week Self   Sig: Place 1 tablet under the tongue once daily.   empagliflozin (Jardiance) 25 mg 7/3/2025 Morning Self   Sig: Take 1 tablet (25 mg) by mouth once daily.   glimepiride (Amaryl) 1 mg tablet 7/3/2025 Morning Self   Sig: Take 1 tablet (1 mg) by mouth 2 times daily   (morning and late afternoon).   lisinopril 10 mg tablet 7/3/2025 Morning Self   Sig: Take 1 tablet (10 mg) by mouth once daily.   meloxicam (Mobic) 15 mg tablet PRN Self   Sig: Take 1 tablet (15 mg) by mouth if needed.   Take with omeprazole      Facility-Administered Medications: None        The list below reflects the updated allergy list. Please review each documented allergy for additional clarification and justification.  Allergies  Reviewed by Mal Guillaume on 7/3/2025   No Known Allergies         Pharmacy has been updated to [facility], [phone].    Sources used to complete the med history include:    Patient interview, good historian, dispense report, care everywhere, chart review history    Below are additional concerns with the patient's PTA list.  None    Mal Guillaume CPhT  Please reach out via [employer] Secure Chat for questions

## 2025-07-03 NOTE — ED PROVIDER NOTES
HPI   Chief Complaint   Patient presents with    Animal Bite     Dog bite R foot        Patient is a 69-year-old female presents emergency department for evaluation of dog bite to right foot.  Patient states that her dogs got into a fight and went to separate them when one of her dogs bit her on the right ankle and foot.  She sustained small lacerations.  She states that her dog is up-to-date on other immunizations including rabies.  Patient states that her last tetanus shot was within the last 5 years.  She denies any other injuries at this time feels relatively well otherwise.  She denies any bites elsewhere other than the right ankle and foot.  She has pain but range of motion is otherwise intact.      History provided by:  Patient   used: No            Patient History   Medical History[1]  Surgical History[2]  Family History[3]  Social History[4]    Physical Exam   ED Triage Vitals [07/03/25 1549]   Temp Heart Rate Respirations BP   -- 100 16 148/87      Pulse Ox Temp src Heart Rate Source Patient Position   96 % -- -- --      BP Location FiO2 (%)     -- --       Physical Exam  Constitutional:       Appearance: Normal appearance.   Cardiovascular:      Rate and Rhythm: Normal rate and regular rhythm.   Pulmonary:      Effort: Pulmonary effort is normal.      Breath sounds: Normal breath sounds.   Musculoskeletal:         General: Tenderness and signs of injury present. Normal range of motion.      Comments: To small puncture wound to medial right ankle with minimal oozing of blood and 1 small laceration to lateral right ankle.  Ecchymosis to medial lateral ankle and anterior ankle.   Skin:     General: Skin is warm and dry.   Neurological:      General: No focal deficit present.      Mental Status: She is alert and oriented to person, place, and time.           ED Course & MDM   Diagnoses as of 07/04/25 2119   Dog bite, initial encounter   Contusion of right ankle, initial encounter                  No data recorded                                 Medical Decision Making  Patient is a 69-year-old female presents emergency department for evaluation of dog bite to right ankle and foot.    Labwork not warranted at today's visit.    Scans done today were interpreted/confirmed by radiologist and also interpreted by me which included x-ray right ankle and x-ray right foot.  X-ray right ankle and foot shows no acute osseous abnormality in the right ankle with generally soft tissue swelling in the region of dog bite without evidence for foreign body with no definite fracture about the foot with subtle trabecular lucencies around the lateral base of the second phalanx likely related to overlapping shadows.    Medications given at today's visit include p.o. Percocet, p.o. Augmentin    I saw this patient independently.  Patient up-to-date on tetanus immunization at this time.  X-rays show no evidence for acute bony abnormality.  X-ray right foot shows subtle trabecular lucency likely overlapping shadows and patient has no active point tenderness in this area do not suspect this likely fracture.  She has widespread ecchymosis and lacerations are very small and more puncture to medial and lateral ankle.  These were anesthetized and copiously irrigated and cleaned.  No repair warranted As puncture wounds are well-approximated at this time and wounds will heal by second intention to prevent further infection or complication.  Area was bandage and patient started on oral antibiotics of Augmentin for infection prophylaxis.  Patient able to ambulate without any difficulty and educated on continue ice elevation and Tylenol ibuprofen help with pain.  She was agreeable plan and discharge at this time.  Emergent pathologies were considered for this patient, although I have low suspicion for anything acutely emergent given patient's clinical presentation, history, physical exam, stable vital signs, and relatively  unremarkable workup.  Discharging patient home is reasonable plan of care for outpatient management.    All labs, imaging, and diagnostic studies were reviewed by me and patient was counseled on clinical impression, expectations, and plan.  Patient was educated to follow-up with PCP in the following 1-2 days.  All questions from patient were answered. They elicited understanding and were agreeable to course of treatment.  Patient was discharged in stable condition and given strict return precautions.    Prescriptions given on discharge: P.o. Augmentin    ** Disclaimer:  Parts of this document were written utilizing a voice to text dictation software.  Note may contain minor transcription or typographical errors that were inadvertently transcribed by the computer software.        Procedure  Procedures       [1]   Past Medical History:  Diagnosis Date    Acquired lymphedema     Arthritis     DVT, lower extremity, distal, acute, right (Multi)     Hyperlipidemia     Hypertension     Liver fibrosis     Macular degeneration, age related, exudative (Multi)     Osteoarthritis     Osteopenia     Type 2 diabetes mellitus (Multi)    [2]   Past Surgical History:  Procedure Laterality Date    APPENDECTOMY       SECTION, LOW TRANSVERSE      CHOLECYSTECTOMY      COLONOSCOPY      DILATION AND CURETTAGE OF UTERUS      FOOT SURGERY      KNEE ARTHROPLASTY      KNEE SURGERY      LIVER BIOPSY N/A     OTHER SURGICAL HISTORY      Low back surgery   [3]   Family History  Problem Relation Name Age of Onset    Colon cancer Mother      Cervical cancer Mother      Colon cancer Father      Heart attack Father      Hypertension Brother     [4]   Social History  Tobacco Use    Smoking status: Never    Smokeless tobacco: Never   Vaping Use    Vaping status: Never Used   Substance Use Topics    Alcohol use: Not on file     Comment: rarely    Drug use: Never        Judy Rodriguez PA-C  [phone]

## 2025-07-03 NOTE — DISCHARGE INSTRUCTIONS
Please follow close with your primary care provider in the following week for wound recheck.  Take and complete entire course of antibiotics as prescribed.  Continue ice elevation and continue keeping area wrapped.  Continue Tylenol ibuprofen as needed for aches and pains.

## 2025-08-01 ENCOUNTER — HOSPITAL ENCOUNTER (OUTPATIENT)
Dept: RADIOLOGY | Facility: CLINIC | Age: 70
Discharge: HOME | End: 2025-08-01
Payer: COMMERCIAL

## 2025-08-01 ENCOUNTER — OFFICE VISIT (OUTPATIENT)
Dept: ORTHOPEDIC SURGERY | Facility: CLINIC | Age: 70
End: 2025-08-01
Payer: COMMERCIAL

## 2025-08-01 DIAGNOSIS — M43.00 SPONDYLOLYSIS: ICD-10-CM

## 2025-08-01 DIAGNOSIS — M54.50 LUMBAR PAIN: ICD-10-CM

## 2025-08-01 PROCEDURE — 99202 OFFICE O/P NEW SF 15 MIN: CPT | Performed by: ORTHOPAEDIC SURGERY

## 2025-08-01 PROCEDURE — 72110 X-RAY EXAM L-2 SPINE 4/>VWS: CPT

## 2025-08-01 RX ORDER — DEXAMETHASONE 0.75 MG/1
0.75 TABLET ORAL
Qty: 28 TABLET | Refills: 0 | Status: SHIPPED | OUTPATIENT
Start: 2025-08-01 | End: 2025-08-15

## 2025-08-01 NOTE — PROGRESS NOTES
History of Present Illness  Chief Complaint   Patient presents with    Lower Back - New Patient Visit     Xrays today       Patient presents for evaluation regards to chronic low back pain.  This is worth prolonged standing and increased activities.  Patient denies any radicular pain rating down the legs.  Has a history of spinal surgery with decompression without fusion.  Patient denies any weakness in her legs and overall feels good in most other areas of her body.    Medical History[1]    Medication Documentation Review Audit       Reviewed by Caryn Luu MA (Medical Assistant) on 08/01/25 at 0832      Medication Order Taking? Sig Documenting Provider Last Dose Status   atorvastatin (Lipitor) 10 mg tablet [id number] No Take 1 tablet (10 mg) by mouth once daily. Demetrio Boyd MD 7/3/2025 Morning Active   cholecalciferol (Vitamin D-3) 50 mcg (2,000 unit) capsule [id number] No Take 1 capsule (2,000 Units) by mouth early in the morning.. Demetrio Boyd MD 7/3/2025 Morning Active   cyanocobalamin, vitamin B-12, (VITAMIN B-12 SL) [id number] No Place 1 tablet under the tongue once daily. Historical Provider, MD Past Week Active   empagliflozin (Jardiance) 25 mg [id number] No Take 1 tablet (25 mg) by mouth once daily. Demetrio Provider, MD 7/3/2025 Morning Active   glimepiride (Amaryl) 1 mg tablet [id number] No Take 1 tablet (1 mg) by mouth 2 times daily (morning and late afternoon). Demetrio Provider, MD 7/3/2025 Morning Active   HYDROcodone-acetaminophen (Norco) 5-325 mg tablet [id number] No Take 1 tablet by mouth every 4 hours if needed for severe pain (7 - 10).   Patient not taking: Reported on 7/3/2025    Daniel Bridges MD Not Taking Active   lisinopril 10 mg tablet [id number] No Take 1 tablet (10 mg) by mouth once daily. Demetrio Boyd MD 7/3/2025 Morning Active   meloxicam (Mobic) 15 mg tablet [id number] No Take 1 tablet (15 mg) by mouth if needed. Take with omeprazole Historical Provider  MD Unknown Active   OMEPRAZOLE ORAL [id number] No Take 1 capsule by mouth if needed. Take with Meloxicam Historical Provider, MD Unknown Active                    RX Allergies[2]    Social History     Socioeconomic History    Marital status:      Spouse name: Not on file    Number of children: Not on file    Years of education: Not on file    Highest education level: Not on file   Occupational History    Not on file   Tobacco Use    Smoking status: Never    Smokeless tobacco: Never   Vaping Use    Vaping status: Never Used   Substance and Sexual Activity    Alcohol use: Not on file     Comment: rarely    Drug use: Never    Sexual activity: Not on file   Other Topics Concern    Not on file   Social History Narrative    Not on file     Social Drivers of Health     Financial Resource Strain: Low Risk  (4/11/2023)    Received from [facility]    Overall Financial Resource Strain (CARDIA)     Difficulty of Paying Living Expenses: Not very hard   Food Insecurity: No Food Insecurity (4/11/2023)    Received from [facility]    Hunger Vital Sign     Within the past 12 months, you worried that your food would run out before you got the money to buy more.: Never true     Within the past 12 months, the food you bought just didn't last and you didn't have money to get more.: Never true   Transportation Needs: No Transportation Needs (4/11/2023)    Received from [facility]    PRAPARE - Transportation     Lack of Transportation (Medical): No     Lack of Transportation (Non-Medical): No   Physical Activity: Unknown (4/11/2023)    Received from [facility]    Exercise Vital Sign     On average, how many days per week do you engage in moderate to strenuous exercise (like a brisk walk)?: 0 days     On average, how many minutes do you engage in exercise at this level?: Patient declined   Stress: Stress Concern Present (4/11/2023)    Received from [facility] -  Occupational Stress Questionnaire     Feeling of Stress : To some extent   Social Connections: Moderately Isolated (4/11/2023)    Received from [facility]    Social Connection and Isolation Panel     In a typical week, how many times do you talk on the phone with family, friends, or neighbors?: More than three times a week     How often do you get together with friends or relatives?: More than three times a week     How often do you attend Zoroastrian or Restorationism services?: Never     Do you belong to any clubs or organizations such as Zoroastrian groups, unions, fraternal or athletic groups, or school groups?: No     How often do you attend meetings of the clubs or organizations you belong to?: Never     Are you , , , , never , or living with a partner?:    Intimate Partner Violence: Not on file   Housing Stability: Not on file       Surgical History[3]         Review of Systems   GENERAL: Negative for malaise, significant weight loss, fever  MUSCULOSKELETAL: see HPI  NEURO:  Negative      Exam  Back: Patient is tender to palpation about the lumbar spine.  Prior surgical incision is well-healed.    Bilateral lower extremities: Good strength in knee extension knee flexion and ambulates with a symmetric gait pattern.     Imaging    AP, lateral, flexion-extension views of the lumbar spine demonstrate  advanced degenerative change of the lower lumbar spine with complete  disc space collapse at the lower lumbar levels most notably between  L4 and L5 and L5 on S1 with loss of lordosis      IMPRESSION:  Advanced spondylosis with degenerative disc disease lumbar spine       Assessment  Spondylosis of the lumbar spine     Plan  Patient experiencing symptoms consistent with spondylosis lumbar spine.  Patient experiencing symptoms associated with this low back pain without radicular symptoms.  Will trial a brief course of oral steroid medication.  In an effort to reduce inflammation  recommend a brief course of oral steroid medication in the form of dexamethasone 0.75 mg to be taken 3 times daily for the next 14 days.  Patient was counseled at risk of utilization of oral steroid medication and the potential benefits to reduce inflammation.  Additionally, patient would likely benefit substantially from treatment in the form of directed spinal injections.  Patient would likely benefit from facet injections and/or epidural injection as patient is not having radicular symptoms at this time.  Will make referral to pain management in that regard.  Will follow-up with patient in 2 months or on an as-needed basis.          [1]   Past Medical History:  Diagnosis Date    Acquired lymphedema     Arthritis     DVT, lower extremity, distal, acute, right (Multi)     Hyperlipidemia     Hypertension     Liver fibrosis     Macular degeneration, age related, exudative (Multi)     Osteoarthritis     Osteopenia     Type 2 diabetes mellitus (Multi)    [2] No Known Allergies  [3]   Past Surgical History:  Procedure Laterality Date    APPENDECTOMY       SECTION, LOW TRANSVERSE      CHOLECYSTECTOMY      COLONOSCOPY      DILATION AND CURETTAGE OF UTERUS      FOOT SURGERY      KNEE ARTHROPLASTY      KNEE SURGERY      LIVER BIOPSY N/A     OTHER SURGICAL HISTORY      Low back surgery

## 2025-09-04 ENCOUNTER — OFFICE VISIT (OUTPATIENT)
Dept: PAIN MEDICINE | Facility: CLINIC | Age: 70
End: 2025-09-04
Payer: COMMERCIAL

## 2025-09-04 VITALS
DIASTOLIC BLOOD PRESSURE: 66 MMHG | HEIGHT: 67 IN | HEART RATE: 80 BPM | RESPIRATION RATE: 18 BRPM | BODY MASS INDEX: 37.67 KG/M2 | SYSTOLIC BLOOD PRESSURE: 126 MMHG | OXYGEN SATURATION: 97 % | WEIGHT: 240 LBS

## 2025-09-04 DIAGNOSIS — M48.062 SPINAL STENOSIS OF LUMBAR REGION WITH NEUROGENIC CLAUDICATION: Primary | ICD-10-CM

## 2025-09-04 DIAGNOSIS — M54.51 VERTEBROGENIC LOW BACK PAIN: ICD-10-CM

## 2025-09-04 PROCEDURE — 1125F AMNT PAIN NOTED PAIN PRSNT: CPT | Performed by: STUDENT IN AN ORGANIZED HEALTH CARE EDUCATION/TRAINING PROGRAM

## 2025-09-04 PROCEDURE — 1159F MED LIST DOCD IN RCRD: CPT | Performed by: STUDENT IN AN ORGANIZED HEALTH CARE EDUCATION/TRAINING PROGRAM

## 2025-09-04 PROCEDURE — 99204 OFFICE O/P NEW MOD 45 MIN: CPT | Performed by: STUDENT IN AN ORGANIZED HEALTH CARE EDUCATION/TRAINING PROGRAM

## 2025-09-04 PROCEDURE — 1036F TOBACCO NON-USER: CPT | Performed by: STUDENT IN AN ORGANIZED HEALTH CARE EDUCATION/TRAINING PROGRAM

## 2025-09-04 PROCEDURE — 3008F BODY MASS INDEX DOCD: CPT | Performed by: STUDENT IN AN ORGANIZED HEALTH CARE EDUCATION/TRAINING PROGRAM

## 2025-09-04 PROCEDURE — 1160F RVW MEDS BY RX/DR IN RCRD: CPT | Performed by: STUDENT IN AN ORGANIZED HEALTH CARE EDUCATION/TRAINING PROGRAM

## 2025-09-04 PROCEDURE — 99213 OFFICE O/P EST LOW 20 MIN: CPT

## 2025-09-04 ASSESSMENT — PAIN SCALES - GENERAL
PAINLEVEL_OUTOF10: 2
PAINLEVEL_OUTOF10: 2

## 2025-09-04 ASSESSMENT — PATIENT HEALTH QUESTIONNAIRE - PHQ9
2. FEELING DOWN, DEPRESSED OR HOPELESS: NOT AT ALL
1. LITTLE INTEREST OR PLEASURE IN DOING THINGS: NOT AT ALL
SUM OF ALL RESPONSES TO PHQ9 QUESTIONS 1 AND 2: 0

## 2025-09-04 ASSESSMENT — PAIN DESCRIPTION - DESCRIPTORS: DESCRIPTORS: ACHING

## 2025-09-04 ASSESSMENT — PAIN - FUNCTIONAL ASSESSMENT: PAIN_FUNCTIONAL_ASSESSMENT: 0-10

## 2025-09-04 ASSESSMENT — LIFESTYLE VARIABLES: TOTAL SCORE: 0

## (undated) DEVICE — DRESSING, GAUZE, SUPER KERLIX, 6X6

## (undated) DEVICE — SOLUTION, IRRIGATION, SODIUM CHLORIDE 0.9%, 1000 ML, POUR BOTTLE

## (undated) DEVICE — SPLINT, FAST SETTING, 5 X 30 IN, PLASTER

## (undated) DEVICE — TOWEL PACK, STERILE, 4/PACK, BLUE

## (undated) DEVICE — Device

## (undated) DEVICE — GLOVE, SURGICAL, PROTEXIS PI , 7.5, PF, LF

## (undated) DEVICE — SUTURE, MONOCRYL, 4-0, 27 IN, PS-2, UNDYED

## (undated) DEVICE — SPLINT, FAST SETTING, 3 X 15 IN, PLASTER, BLUE

## (undated) DEVICE — DRESSING, GAUZE, PETROLATUM, PATCH, XEROFORM, 1 X 8 IN, STERILE

## (undated) DEVICE — CORD, CAUTERY, BIOPOLAR FORCEP, 12FT

## (undated) DEVICE — PADDING, WEBRIL, UNDERCAST, STERILE, 2IN

## (undated) DEVICE — BANDAGE, ELASTIC, PREMIUM, SELF-CLOSE, 2 IN X 5 YD, STERILE

## (undated) DEVICE — SUTURE, ETHILON, 5-0, 18 IN, PS2, BLK

## (undated) DEVICE — APPLICATOR, CHLORAPREP, W/ORANGE TINT, 26ML

## (undated) DEVICE — SOLUTION, IRRIGATION, STERILE WATER, 1000 ML, POUR BOTTLE

## (undated) DEVICE — CUFF, TOURNIQUET, 18 X 4, DUAL PORT/SNGL BLADDER, DISP, LF